# Patient Record
Sex: FEMALE | Race: WHITE
[De-identification: names, ages, dates, MRNs, and addresses within clinical notes are randomized per-mention and may not be internally consistent; named-entity substitution may affect disease eponyms.]

---

## 2017-05-03 ENCOUNTER — HOSPITAL ENCOUNTER (OUTPATIENT)
Dept: HOSPITAL 62 - WI | Age: 62
End: 2017-05-03
Attending: FAMILY MEDICINE
Payer: MEDICARE

## 2017-05-03 DIAGNOSIS — Z12.31: Primary | ICD-10-CM

## 2017-05-31 ENCOUNTER — HOSPITAL ENCOUNTER (OUTPATIENT)
Dept: HOSPITAL 62 - WI | Age: 62
End: 2017-05-31
Attending: FAMILY MEDICINE
Payer: MEDICARE

## 2017-05-31 DIAGNOSIS — Z12.31: Primary | ICD-10-CM

## 2017-05-31 PROCEDURE — 76641 ULTRASOUND BREAST COMPLETE: CPT

## 2017-05-31 NOTE — WOMENS IMAGING REPORT
EXAM DESCRIPTION:  U/S BREAST UNILATERAL, COMPL



COMPLETED DATE/TIME:  5/31/2017 9:49 am



REASON FOR STUDY:  ROUTINE SCREENING;Z12.31 Z12.31  ENCNTR SCREEN MAMMOGRAM FOR MALIGNANT NEOPLASM OF
 JEANE



COMPARISON:  Limited bilateral mammograms 11/24/2015, 5/20/2014, 2/25/2013



TECHNIQUE:  Real-time and static grayscale imaging performed of the right and left whole breast. Tamika
cted color Doppler images recorded.

The patient is in a motorized wheelchair and unable to participate with positioning for mammography t
nisha.



LIMITATIONS:  None.



FINDINGS:  Entire right breast and entire left breast for image with ultrasound.  There is dense fibr
oglandular tissue bilaterally without cysts, masses, or worrisome acoustic absorption.  No focal find
ings.



IMPRESSION:  No suspicious findings detected in the right or left breast by ultrasound.



BIRAD:  1 Negative.



RECOMMENDATION:  RECOMMENDED FOLLOW-UP: Follow-up as clinically indicated.



COMMENT:  The American College of Radiology (ACR) has developed recommendations for screening MRI of 
the breasts in certain patient populations, to be used in conjunction with mammography.  Breast MRI s
urveillance may be appropriate for women with more than 20% lifetime risk of developing breast cancer
  as determined by genetic testing, significant family history of the disease, or history of mantle r
adiation for Hodgkins Disease.  ACR Practice Guidelines 2008.



TECHNICAL DOCUMENTATION:  JOB ID:  [id number]

 2011 [employer]- All Rights Reserved

## 2017-05-31 NOTE — WOMENS IMAGING REPORT
EXAM DESCRIPTION:  U/S BREAST UNILATERAL, COMPL



COMPLETED DATE/TIME:  5/31/2017 9:49 am



REASON FOR STUDY:  ROUTINE SCREENING;Z12.31 Z12.31  ENCNTR SCREEN MAMMOGRAM FOR MALIGNANT NEOPLASM OF
 JEANE



COMPARISON:  Limited bilateral mammograms 11/24/2015, 5/20/2014, 2/25/2013



TECHNIQUE:  Real-time and static grayscale imaging performed of the right and left whole breast. Tamika
cted color Doppler images recorded.

The patient is in a motorized wheelchair and unable to participate with positioning for mammography t
nisha.



LIMITATIONS:  None.



FINDINGS:  Entire right breast and entire left breast for image with ultrasound.  There is dense fibr
oglandular tissue bilaterally without cysts, masses, or worrisome acoustic absorption.  No focal find
ings.



IMPRESSION:  No suspicious findings detected in the right or left breast by ultrasound.



BIRAD:  1 Negative.



RECOMMENDATION:  RECOMMENDED FOLLOW-UP: Follow-up as clinically indicated.



COMMENT:  The American College of Radiology (ACR) has developed recommendations for screening MRI of 
the breasts in certain patient populations, to be used in conjunction with mammography.  Breast MRI s
urveillance may be appropriate for women with more than 20% lifetime risk of developing breast cancer
  as determined by genetic testing, significant family history of the disease, or history of mantle r
adiation for Hodgkins Disease.  ACR Practice Guidelines 2008.



TECHNICAL DOCUMENTATION:  JOB ID:  [id number]

 2011 [employer]- All Rights Reserved

## 2018-09-15 ENCOUNTER — HOSPITAL ENCOUNTER (INPATIENT)
Dept: HOSPITAL 62 - ER | Age: 63
LOS: 9 days | Discharge: HOME | DRG: 178 | End: 2018-09-24
Attending: INTERNAL MEDICINE | Admitting: INTERNAL MEDICINE
Payer: MEDICARE

## 2018-09-15 DIAGNOSIS — I48.91: ICD-10-CM

## 2018-09-15 DIAGNOSIS — E34.3: ICD-10-CM

## 2018-09-15 DIAGNOSIS — E87.6: ICD-10-CM

## 2018-09-15 DIAGNOSIS — K44.9: ICD-10-CM

## 2018-09-15 DIAGNOSIS — J44.9: ICD-10-CM

## 2018-09-15 DIAGNOSIS — Z93.6: ICD-10-CM

## 2018-09-15 DIAGNOSIS — K80.20: ICD-10-CM

## 2018-09-15 DIAGNOSIS — I10: ICD-10-CM

## 2018-09-15 DIAGNOSIS — F32.9: ICD-10-CM

## 2018-09-15 DIAGNOSIS — Z88.6: ICD-10-CM

## 2018-09-15 DIAGNOSIS — J69.0: Primary | ICD-10-CM

## 2018-09-15 DIAGNOSIS — Z79.899: ICD-10-CM

## 2018-09-15 DIAGNOSIS — G80.9: ICD-10-CM

## 2018-09-15 DIAGNOSIS — K21.9: ICD-10-CM

## 2018-09-15 DIAGNOSIS — Z59.1: ICD-10-CM

## 2018-09-15 DIAGNOSIS — Z82.49: ICD-10-CM

## 2018-09-15 DIAGNOSIS — Z93.3: ICD-10-CM

## 2018-09-15 DIAGNOSIS — K56.0: ICD-10-CM

## 2018-09-15 LAB
%HYPO/RBC NFR BLD AUTO: SLIGHT %
ABSOLUTE LYMPHOCYTES# (MANUAL): 2.3 10^3/UL (ref 0.5–4.7)
ABSOLUTE MONOCYTES # (MANUAL): 0.6 10^3/UL (ref 0.1–1.4)
ABSOLUTE NEUTROPHILS# (MANUAL): 18.1 10^3/UL (ref 1.7–8.2)
ADD MANUAL DIFF: YES
ALBUMIN SERPL-MCNC: 3.7 G/DL (ref 3.5–5)
ALP SERPL-CCNC: 129 U/L (ref 38–126)
ALT SERPL-CCNC: 25 U/L (ref 9–52)
ANION GAP SERPL CALC-SCNC: 11 MMOL/L (ref 5–19)
APPEARANCE UR: CLEAR
APTT PPP: YELLOW S
AST SERPL-CCNC: 21 U/L (ref 14–36)
BASOPHILS NFR BLD MANUAL: 0 % (ref 0–2)
BILIRUB DIRECT SERPL-MCNC: 0.6 MG/DL (ref 0–0.4)
BILIRUB SERPL-MCNC: 0.8 MG/DL (ref 0.2–1.3)
BILIRUB UR QL STRIP: NEGATIVE
BUN SERPL-MCNC: 10 MG/DL (ref 7–20)
CALCIUM: 9.6 MG/DL (ref 8.4–10.2)
CHLORIDE SERPL-SCNC: 100 MMOL/L (ref 98–107)
CO2 SERPL-SCNC: 26 MMOL/L (ref 22–30)
EOSINOPHIL NFR BLD MANUAL: 1 % (ref 0–6)
ERYTHROCYTE [DISTWIDTH] IN BLOOD BY AUTOMATED COUNT: 14.7 % (ref 11.5–14)
GLUCOSE SERPL-MCNC: 116 MG/DL (ref 75–110)
GLUCOSE UR STRIP-MCNC: NEGATIVE MG/DL
HCT VFR BLD CALC: 42.6 % (ref 36–47)
HGB BLD-MCNC: 13.9 G/DL (ref 12–15.5)
KETONES UR STRIP-MCNC: 300 MG/DL
LIPASE SERPL-CCNC: 15.9 U/L (ref 23–300)
MCH RBC QN AUTO: 28 PG (ref 27–33.4)
MCHC RBC AUTO-ENTMCNC: 32.6 G/DL (ref 32–36)
MCV RBC AUTO: 86 FL (ref 80–97)
MONOCYTES % (MANUAL): 3 % (ref 3–13)
NITRITE UR QL STRIP: NEGATIVE
PH UR STRIP: 6 [PH] (ref 5–9)
PLATELET # BLD: 513 10^3/UL (ref 150–450)
PLATELET COMMENT: (no result)
POTASSIUM SERPL-SCNC: 3.8 MMOL/L (ref 3.6–5)
PROT SERPL-MCNC: 6.9 G/DL (ref 6.3–8.2)
PROT UR STRIP-MCNC: NEGATIVE MG/DL
RBC # BLD AUTO: 4.96 10^6/UL (ref 3.72–5.28)
SEGMENTED NEUTROPHILS % (MAN): 85 % (ref 42–78)
SODIUM SERPL-SCNC: 137.3 MMOL/L (ref 137–145)
SP GR UR STRIP: 1.02
TOTAL CELLS COUNTED BLD: 100
TOXIC GRANULES BLD QL SMEAR: (no result)
UROBILINOGEN UR-MCNC: NEGATIVE MG/DL (ref ?–2)
VARIANT LYMPHS NFR BLD MANUAL: 9 % (ref 13–45)
WBC # BLD AUTO: 21.3 10^3/UL (ref 4–10.5)

## 2018-09-15 PROCEDURE — 83690 ASSAY OF LIPASE: CPT

## 2018-09-15 PROCEDURE — 3E0F73Z INTRODUCTION OF ANTI-INFLAMMATORY INTO RESPIRATORY TRACT, VIA NATURAL OR ARTIFICIAL OPENING: ICD-10-PCS | Performed by: INTERNAL MEDICINE

## 2018-09-15 PROCEDURE — 85027 COMPLETE CBC AUTOMATED: CPT

## 2018-09-15 PROCEDURE — 83880 ASSAY OF NATRIURETIC PEPTIDE: CPT

## 2018-09-15 PROCEDURE — 80048 BASIC METABOLIC PNL TOTAL CA: CPT

## 2018-09-15 PROCEDURE — 81001 URINALYSIS AUTO W/SCOPE: CPT

## 2018-09-15 PROCEDURE — 96374 THER/PROPH/DIAG INJ IV PUSH: CPT

## 2018-09-15 PROCEDURE — 80053 COMPREHEN METABOLIC PANEL: CPT

## 2018-09-15 PROCEDURE — 83735 ASSAY OF MAGNESIUM: CPT

## 2018-09-15 PROCEDURE — 96361 HYDRATE IV INFUSION ADD-ON: CPT

## 2018-09-15 PROCEDURE — 94799 UNLISTED PULMONARY SVC/PX: CPT

## 2018-09-15 PROCEDURE — 71045 X-RAY EXAM CHEST 1 VIEW: CPT

## 2018-09-15 PROCEDURE — 94640 AIRWAY INHALATION TREATMENT: CPT

## 2018-09-15 PROCEDURE — 84132 ASSAY OF SERUM POTASSIUM: CPT

## 2018-09-15 PROCEDURE — 87040 BLOOD CULTURE FOR BACTERIA: CPT

## 2018-09-15 PROCEDURE — 74176 CT ABD & PELVIS W/O CONTRAST: CPT

## 2018-09-15 PROCEDURE — 36415 COLL VENOUS BLD VENIPUNCTURE: CPT

## 2018-09-15 PROCEDURE — 85025 COMPLETE CBC W/AUTO DIFF WBC: CPT

## 2018-09-15 PROCEDURE — 74018 RADEX ABDOMEN 1 VIEW: CPT

## 2018-09-15 PROCEDURE — 99291 CRITICAL CARE FIRST HOUR: CPT

## 2018-09-15 RX ADMIN — HEPARIN SODIUM SCH UNIT: 5000 INJECTION, SOLUTION INTRAVENOUS; SUBCUTANEOUS at 21:30

## 2018-09-15 RX ADMIN — IPRATROPIUM BROMIDE AND ALBUTEROL SULFATE SCH ML: 2.5; .5 SOLUTION RESPIRATORY (INHALATION) at 23:55

## 2018-09-15 SDOH — ECONOMIC STABILITY - HOUSING INSECURITY: INADEQUATE HOUSING: Z59.1

## 2018-09-15 NOTE — PDOC CONSULTATION
Consultation


Consult Date: 09/15/18


Consult reason:: Possible bowel obstruction seen on CT scan.





History of Present Illness


Admission Date/PCP: 


  





  BEAR BUI MD





History of Present Illness: 


NABILA MAGALLANES is a 63 year old female seen in consultation at the request 

of the emergency room physician.  This is a patient with cerebral palsy who 

reports coughing and vomiting.  The patient has reported malaise and fatigue 

for the last several days.  The patient has a left lower quadrant descending 

colostomy.  The patient has emptied her colostomy bag 4 times today.  There is 

currently air in the bag.  The patient reports vomiting after severe coughing 

spells.  The patient has not noticed significant fevers or chills.  The patient 

denies chest pain, abdominal pain, dizziness, blurry vision.  The patient does 

report lower extremity edema.  She denies specific food intolerances.








Past Medical History


Cardiac Medical History: Reports: Atrial Fibrillation, Hypertension


Pulmonary Medical History: Reports: Asthma, Chronic Obstructive Pulmonary 

Disease (COPD)


   Denies: Tuberculosis


Neurological Medical History: Reports: Other - Cerebral palsy


   Denies: Seizures


GI Medical History: Reports: Gastroesophageal Reflux Disease, Hiatal Hernia


Psychiatric Medical History: Reports: Depression





Past Surgical History


Past Surgical History: Reports: Orthopedic Surgery - multiple ortho surgeries 

rt cp, Other - Left lower quadrant colostomy.  Urostomy.


   Denies: Hysterectomy





Social History


Information Source: Patient


Smoking Status: Unknown if Ever Smoked


Frequency of Alcohol Use: None


Hx Recreational Drug Use: No


Hx Prescription Drug Abuse: No





Family History


Family History: Reviewed & Not Pertinent


Parental Family History Reviewed: Yes


Children Family History Reviewed: Yes


Sibling(s) Family History Reviewed.: Yes





Medication/Allergy


Home Medications: 








Celecoxib [Celebrex 100 mg Capsule] 100 mg PO BID 06/27/13 


Oxybutynin Chloride [Ditropan 5 mg Tablet] 10 mg PO Q8 PRN 06/27/13 


Cyclobenzaprine HCl [Flexeril 10 mg Tablet] 20 mg PO TID PRN 06/28/13 


Tramadol HCl [Ultram 50 mg Tablet] 50 mg PO Q6HP PRN 06/28/13 


Diltiazem HCl [Cardizem Cd 180 mg Capsule] 180 mg PO Q12H 06/30/13 


Duloxetine HCl [Cymbalta 30 mg Capsule.dr] 60 mg PO DAILY #0 capsule.dr 06/30/ 13 


Topiramate [Topamax 25 mg Tablet] 25 mg PO Q6 #0 tablet 06/30/13 


Buprenorphine [Butrans] 1 each TD Q7D 07/27/14 


Cetirizine HCl [Zyrtec 10 mg Tablet] 1 tab PO DAILY 07/27/14 


Esomeprazole Magnesium [Nexium] 40 mg PO BID 07/27/14 


Ferrous Sulfate [Iron Supplement] 325 mg PO BID 07/27/14 


Linaclotide [Linzess 145 Mcg Capsule] 145 mcg PO Q2D 07/27/14 


Lubiprostone [Amitiza 8 Mcg Capsule] 1 cap PO BID 07/27/14 


Mometasone Furoate [Nasonex] 2 spray NS DAILY 07/27/14 


Montelukast Sodium [Singulair 10 mg Tablet] 10 mg PO QHS 07/27/14 


Polyethylene Glycol 3350 [Clearlax] 119 gm PO DAILY 07/27/14 


Polyethylene Glycol 3350 [Miralax Powder 17 gm/Packet] 1 packet PO DAILY 07/27/ 14 


Albuterol Sulfate [Ventolin Hfa] 2 puff IH Q6HP PRN 07/28/14 


Butalb/Acetaminophen/Caffeine [Fioricet (-40 mg) Tablet] 1 tab PO Q6HP 

PRN 07/28/14 


Diazepam [Valium 5 mg Tablet] 10 mg PO BIDP PRN 07/28/14 


Fluticasone/Salmeterol [Advair 500-50 Diskus 28 Dose] 1 inh IH Q12H 07/28/14 


Guaifenesin [Mucinex Sr 600 mg Tablet.sa] 600 mg PO Q12 PRN 07/28/14 


Magnesium Citrate 296 ml PO DAILY PRN 07/28/14 


Oxycodone HCl 5 mg PO Q8H PRN 07/28/14 


Oxycodone HCl/Acetaminophen [Percocet 5-325 mg Tablet] 1 - 2 tab PO ASDIR PRN 07 /28/14 


Rizatriptan Benzoate [Maxalt Mlt] 10 mg PO DAILY PRN 07/28/14 


Trazodone HCl 50 mg PO QHS PRN 07/28/14 


Fluticasone/Salmeterol [Advair 250-50 Diskus 14 Dose/Diskus] 1 inh IH Q12 #0 

inhaler 07/30/14 


Moxifloxacin HCl 400 mg PO DAILY #10 tablet 07/30/14 








Allergies/Adverse Reactions: 


 





morphine [Morphine] Adverse Reaction (Verified 07/27/14 10:58)


 











Review of Systems


Constitutional: PRESENT: fatigue, weakness.  ABSENT: anorexia, fever(s), 

headache(s), night sweats


Eyes: ABSENT: visual disturbances


Ears: ABSENT: hearing changes


Nose, Mouth, and Throat: ABSENT: sore throat


Cardiovascular: ABSENT: palpitations


Respiratory: PRESENT: cough.  ABSENT: hemoptysis


Gastrointestinal: ABSENT: abdominal pain, bloating


Genitourinary: PRESENT: other - Urostomy


Musculoskeletal: ABSENT: back pain


Integumentary: ABSENT: pruritus, rash


Neurological: ABSENT: confusion, convulsions, dizziness


Psychiatric: ABSENT: anxiety, depression


Endocrine: ABSENT: cold intolerance, heat intolerance


Hematologic/Lymphatic: ABSENT: easy bleeding, easy bruising





Physical Exam


Vital Signs: 


 Intake & Output











 09/14/18 09/15/18 09/16/18





 06:59 06:59 06:59


 


Weight   45.359 kg











General appearance: ABSENT: no acute distress


Head exam: PRESENT: atraumatic


Eye exam: PRESENT: EOMI, PERRLA.  ABSENT: scleral icterus


Mouth exam: PRESENT: moist, neck supple


Neck exam: ABSENT: meningismus, tenderness, thyromegaly, tracheal deviation


Respiratory exam: PRESENT: rhonchi, unlabored.  ABSENT: chest wall tenderness


Cardiovascular exam: PRESENT: irregular rhythm


GI/Abdominal exam: PRESENT: soft, other - Left lower quadrant colostomy in 

place.  Air in the bag.  Lower midline urostomy in place..  ABSENT: distended, 

firm, guarding, tenderness


Rectal exam: PRESENT: deferred


Extremities exam: PRESENT: pedal edema, +2 edema, other - Gaiter sign.


Musculoskeletal exam: PRESENT: deformity


Neurological exam: PRESENT: alert, awake, oriented to person, oriented to place

, oriented to time, oriented to situation


Psychiatric exam: ABSENT: agitated, anxious, depressed


Skin exam: ABSENT: cyanosis, erythema





Results


Laboratory Results: 


 





 09/15/18 18:25 





 09/15/18 18:25 





 











  09/15/18 09/15/18





  18:25 18:25


 


WBC  21.3 H 


 


RBC  4.96 


 


Hgb  13.9 


 


Hct  42.6 


 


MCV  86 


 


MCH  28.0 


 


MCHC  32.6 


 


RDW  14.7 H 


 


Plt Count  513 H 


 


Seg Neutrophils %  Not Reportable 


 


Lymphocytes %  Not Reportable 


 


Monocytes %  Not Reportable 


 


Eosinophils %  Not Reportable 


 


Basophils %  Not Reportable 


 


Absolute Neutrophils  Not Reportable 


 


Absolute Lymphocytes  Not Reportable 


 


Absolute Monocytes  Not Reportable 


 


Absolute Eosinophils  Not Reportable 


 


Absolute Basophils  Not Reportable 


 


Sodium   137.3


 


Potassium   3.8


 


Chloride   100


 


Carbon Dioxide   26


 


Anion Gap   11


 


BUN   10


 


Creatinine   0.44 L


 


Est GFR ( Amer)   > 60


 


Est GFR (Non-Af Amer)   > 60


 


Glucose   116 H


 


Calcium   9.6


 


Total Bilirubin   0.8


 


AST   21


 


ALT   25


 


Alkaline Phosphatase   129 H


 


Total Protein   6.9


 


Albumin   3.7


 


Lipase   15.9 L











Impressions: 


 





Chest X-Ray  09/15/18 00:00


IMPRESSION:  Basilar atelectasis.  Large hiatal hernia.


 








Abdomen/Pelvis CT  09/15/18 16:54


IMPRESSION:  1.  Air-fluid levels within multiple dilated small bowel loops, 

raise concern for distal small bowel obstruction.  Left lower quadrant ostomy 

with a parastomal hernia containing a mildly dilated small bowel loop.


2. Distended gallbladder with cholelithiasis.


3. Trace ascites at the right upper quadrant.


4. Large hiatal hernia with an intrathoracic stomach.


5. Tree-in-bud opacities at the right middle lobe, may be secondary to acute 

infection/inflammation of the smaller airways.


 














Assessment & Plan





- Diagnosis


(1) Cerebral palsy


Qualifiers: 


   Cerebral palsy type: unspecified type   Qualified Code(s): G80.9 - Cerebral 

palsy, unspecified   


Is this a current diagnosis for this admission?: Yes   





(2) Cough


Is this a current diagnosis for this admission?: Yes   





- Plan Summary


Plan Summary: 





This is a 63-year-old female with cerebral palsy.  I was consulted to evaluate 

the possibility of a small bowel obstruction.  I see no clinical or 

radiographic evidence of small bowel obstruction or large bowel obstruction.  

The "decompressed loops of small bowel in the pelvis" are related to the patient

's urostomy and not in continuity with the GI tract.  The patient has air in 

her colostomy bag and has had multiple bowel movements today.  I believe her 

vomiting is related to her coughing spells.  Recommend medical evaluation.  No 

indication for surgical intervention at this time.

## 2018-09-15 NOTE — RADIOLOGY REPORT (SQ)
EXAM DESCRIPTION:  CHEST SINGLE VIEW



COMPLETED DATE/TIME:  9/15/2018 5:17 pm



REASON FOR STUDY:  abd bloating, vomiting



COMPARISON:  7/29/2014



EXAM PARAMETERS:  NUMBER OF VIEWS: 10 obscures the upper chest.

TECHNIQUE: Single frontal radiographic view of the chest acquired.

RADIATION DOSE: NA

LIMITATIONS: None.



FINDINGS:  LUNGS AND PLEURA: Basilar atelectasis.

MEDIASTINUM AND HILAR STRUCTURES: Large retrocardiac hiatal hernia.

HEART AND VASCULAR STRUCTURES: Heart normal in size.  Normal vasculature.

BONES: No acute findings.

HARDWARE: None in the chest.

OTHER: No other significant finding.



IMPRESSION:  Basilar atelectasis.  Large hiatal hernia.



TECHNICAL DOCUMENTATION:  JOB ID:  [id number]

 2011 [employer]- All Rights Reserved



Reading location - IP/workstation name: [email or web address]

## 2018-09-15 NOTE — RADIOLOGY REPORT (SQ)
EXAM DESCRIPTION:  CT ABD/PELVIS NO ORAL OR IV



COMPLETED DATE/TIME:  9/15/2018 5:12 pm



REASON FOR STUDY:  abd bloating, vomiting



COMPARISON:  None.



TECHNIQUE:  CT scan of the abdomen and pelvis performed without intravenous or oral contrast. Images 
reviewed with lung, soft tissue, and bone windows. Reconstructed coronal and sagittal MPR images revi
ewed. All images stored on PACS.

All CT scanners at this facility use dose modulation, iterative reconstruction, and/or weight based d
osing when appropriate to reduce radiation dose to as low as reasonably achievable (ALARA).

[facility]: Dose Right  [facility]: CareDose    [facility]: Dose Right    [facility]: Teradose 4D    [facility]: Smart MySQL



RADIATION DOSE:  CT Rad equipment meets quality standard of care and radiation dose reduction techniq
ues were employed. CTDIvol: 6.4 mGy. DLP: 272 mGy-cm.mGy.



LIMITATIONS:  None.



FINDINGS:  LOWER CHEST: There is a large hiatal hernia with an intrathoracic stomach.  Atelectatic ch
anges are seen at the bilateral lung bases.  Tree-in-bud opacities are noted at the right middle lobe
.

NON-CONTRASTED LIVER, SPLEEN, ADRENALS: Evaluation limited by lack of IV contrast. No identified sign
ificant masses.  Calcific foci at the spleen are probably granulomas

PANCREAS: No peripancreatic inflammatory changes.

GALLBLADDER: The gallbladder is distended.  Multiple small stones are seen at the gallbladder.

RIGHT KIDNEY AND URETER: Assessment for masses limited by lack of IV contrast.   No significant calci
fications.   No hydronephrosis or hydroureter.

LEFT KIDNEY AND URETER: Assessment for masses limited by lack of IV contrast.   No significant calcif
ications.   No hydronephrosis or hydroureter.

AORTA AND RETROPERITONEUM: No abdominal aortic aneurysm. No retroperitoneal hemorrhage or adenopathy.


BOWEL AND PERITONEAL CAVITY: There are multiple dilated small bowel loops with air-fluid levels.  Col
lapsed loops of small bowel are seen in the pelvis.  There is a left lower quadrant ostomy with a par
astomal hernia containing a mildly dilated small bowel loop.  Postsurgical changes are seen at bowel 
loops in the pelvis.  Trace free fluid noted at the right upper quadrant.

APPENDIX: Not visualized.

PELVIS, BLADDER, AND ABDOMINAL WALL:The urinary bladder is surgically absent.  Coarse calcifications 
noted at the pelvis.  No free fluid.  There is a right lower quadrant ostomy.

BONES: There is diffuse osteopenia.  There is thoracolumbar scoliosis.  There is fusion of the left h
ip.



IMPRESSION:  1.  Air-fluid levels within multiple dilated small bowel loops, raise concern for distal
 small bowel obstruction.  Left lower quadrant ostomy with a parastomal hernia containing a mildly di
lated small bowel loop.

2. Distended gallbladder with cholelithiasis.

3. Trace ascites at the right upper quadrant.

4. Large hiatal hernia with an intrathoracic stomach.

5. Tree-in-bud opacities at the right middle lobe, may be secondary to acute infection/inflammation o
f the smaller airways.



COMMENT:  Quality ID # [id number]: Final reports with documentation of one or more dose reduction techniques
 (e.g., Automated exposure control, adjustment of the mA and/or kV according to patient size, use of 
iterative reconstruction technique)



TECHNICAL DOCUMENTATION:  JOB ID:  [id number]

[id number]

 2011 [employer]- All Rights Reserved



Reading location - IP/workstation name: [email or web address]
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
## 2018-09-16 LAB
ADD MANUAL DIFF: NO
ANION GAP SERPL CALC-SCNC: 7 MMOL/L (ref 5–19)
BASOPHILS # BLD AUTO: 0.1 10^3/UL (ref 0–0.2)
BASOPHILS NFR BLD AUTO: 0.6 % (ref 0–2)
BUN SERPL-MCNC: 9 MG/DL (ref 7–20)
CALCIUM: 8.5 MG/DL (ref 8.4–10.2)
CHLORIDE SERPL-SCNC: 109 MMOL/L (ref 98–107)
CO2 SERPL-SCNC: 23 MMOL/L (ref 22–30)
EOSINOPHIL # BLD AUTO: 0.1 10^3/UL (ref 0–0.6)
EOSINOPHIL NFR BLD AUTO: 0.9 % (ref 0–6)
ERYTHROCYTE [DISTWIDTH] IN BLOOD BY AUTOMATED COUNT: 14.4 % (ref 11.5–14)
GLUCOSE SERPL-MCNC: 86 MG/DL (ref 75–110)
HCT VFR BLD CALC: 36.4 % (ref 36–47)
HGB BLD-MCNC: 12 G/DL (ref 12–15.5)
LYMPHOCYTES # BLD AUTO: 2.2 10^3/UL (ref 0.5–4.7)
LYMPHOCYTES NFR BLD AUTO: 15 % (ref 13–45)
MCH RBC QN AUTO: 28.3 PG (ref 27–33.4)
MCHC RBC AUTO-ENTMCNC: 33 G/DL (ref 32–36)
MCV RBC AUTO: 86 FL (ref 80–97)
MONOCYTES # BLD AUTO: 1 10^3/UL (ref 0.1–1.4)
MONOCYTES NFR BLD AUTO: 7 % (ref 3–13)
NEUTROPHILS # BLD AUTO: 11.1 10^3/UL (ref 1.7–8.2)
NEUTS SEG NFR BLD AUTO: 76.5 % (ref 42–78)
PLATELET # BLD: 378 10^3/UL (ref 150–450)
POTASSIUM SERPL-SCNC: 3.5 MMOL/L (ref 3.6–5)
RBC # BLD AUTO: 4.26 10^6/UL (ref 3.72–5.28)
SODIUM SERPL-SCNC: 138.8 MMOL/L (ref 137–145)
TOTAL CELLS COUNTED % (AUTO): 100 %
WBC # BLD AUTO: 14.6 10^3/UL (ref 4–10.5)

## 2018-09-16 RX ADMIN — Medication SCH: at 06:47

## 2018-09-16 RX ADMIN — CEFEPIME HYDROCHLORIDE SCH MLS/HR: 2 INJECTION, SOLUTION INTRAVENOUS at 09:56

## 2018-09-16 RX ADMIN — HEPARIN SODIUM SCH: 5000 INJECTION, SOLUTION INTRAVENOUS; SUBCUTANEOUS at 23:35

## 2018-09-16 RX ADMIN — HEPARIN SODIUM SCH UNIT: 5000 INJECTION, SOLUTION INTRAVENOUS; SUBCUTANEOUS at 16:35

## 2018-09-16 RX ADMIN — DEXAMETHASONE SODIUM PHOSPHATE PRN MG: 10 INJECTION INTRAMUSCULAR; INTRAVENOUS at 16:31

## 2018-09-16 RX ADMIN — HEPARIN SODIUM SCH UNIT: 5000 INJECTION, SOLUTION INTRAVENOUS; SUBCUTANEOUS at 06:49

## 2018-09-16 RX ADMIN — TRAMADOL HYDROCHLORIDE PRN MG: 50 TABLET, FILM COATED ORAL at 00:46

## 2018-09-16 RX ADMIN — DILTIAZEM HYDROCHLORIDE SCH MG: 180 CAPSULE, EXTENDED RELEASE ORAL at 09:57

## 2018-09-16 RX ADMIN — IPRATROPIUM BROMIDE AND ALBUTEROL SULFATE SCH ML: 2.5; .5 SOLUTION RESPIRATORY (INHALATION) at 16:57

## 2018-09-16 RX ADMIN — DEXAMETHASONE SODIUM PHOSPHATE PRN MG: 10 INJECTION INTRAMUSCULAR; INTRAVENOUS at 23:09

## 2018-09-16 RX ADMIN — Medication SCH: at 23:36

## 2018-09-16 RX ADMIN — SODIUM CHLORIDE PRN MLS/HR: 9 INJECTION, SOLUTION INTRAVENOUS at 01:29

## 2018-09-16 RX ADMIN — IPRATROPIUM BROMIDE AND ALBUTEROL SULFATE SCH ML: 2.5; .5 SOLUTION RESPIRATORY (INHALATION) at 08:10

## 2018-09-16 RX ADMIN — SODIUM CHLORIDE PRN MLS/HR: 9 INJECTION, SOLUTION INTRAVENOUS at 06:49

## 2018-09-16 RX ADMIN — DILTIAZEM HYDROCHLORIDE SCH: 180 CAPSULE, EXTENDED RELEASE ORAL at 10:08

## 2018-09-16 RX ADMIN — TRAZODONE HYDROCHLORIDE PRN MG: 50 TABLET ORAL at 00:46

## 2018-09-16 RX ADMIN — Medication SCH: at 00:48

## 2018-09-16 RX ADMIN — CEFEPIME HYDROCHLORIDE SCH: 2 INJECTION, SOLUTION INTRAVENOUS at 23:36

## 2018-09-16 RX ADMIN — Medication SCH ML: at 16:33

## 2018-09-16 RX ADMIN — DILTIAZEM HYDROCHLORIDE SCH: 180 CAPSULE, EXTENDED RELEASE ORAL at 01:27

## 2018-09-16 RX ADMIN — OXYCODONE HYDROCHLORIDE PRN MG: 5 TABLET ORAL at 19:11

## 2018-09-16 RX ADMIN — CEFEPIME HYDROCHLORIDE SCH MLS/HR: 2 INJECTION, SOLUTION INTRAVENOUS at 01:08
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
## 2018-09-16 NOTE — PDOC PROGRESS REPORT
Subjective


Progress Note for:: 09/16/18


Subjective:: 





64 y/o F admitted with pneumonia and cough. She denies any abdominal pain. Her 

colostomy is functioning normally. She has not had any vomiting today. No CP, 

SOB, F/C, melena, dizziness.


Reason For Visit: 


PNEUMONIA








Physical Exam


Vital Signs: 


 











Temp Pulse Resp BP Pulse Ox


 


 98.6 F   102 H  16   140/77 H  98 


 


 09/16/18 11:20  09/16/18 11:20  09/16/18 11:20  09/16/18 11:20  09/16/18 11:20








 Intake & Output











 09/15/18 09/16/18 09/17/18





 06:59 06:59 06:59


 


Intake Total  1100 1050


 


Balance  1100 1050


 


Weight  47.7 kg 











General appearance: PRESENT: no acute distress


Head exam: PRESENT: atraumatic


Eye exam: PRESENT: EOMI.  ABSENT: scleral icterus


Mouth exam: PRESENT: moist


Neck exam: ABSENT: lymphadenopathy, tenderness


Respiratory exam: PRESENT: unlabored.  ABSENT: accessory muscle use, chest wall 

tenderness, tachypnea


Cardiovascular exam: PRESENT: irregular rhythm


Pulses: PRESENT: normal radial pulses


Vascular exam: PRESENT: normal capillary refill.  ABSENT: pallor


GI/Abdominal exam: PRESENT: soft.  ABSENT: distended, guarding, tenderness


Rectal exam: PRESENT: deferred


Musculoskeletal exam: PRESENT: deformity


Neurological exam: PRESENT: alert, awake, oriented to person, oriented to place

, oriented to time, oriented to situation


Psychiatric exam: ABSENT: agitated, anxious, depressed


Skin exam: ABSENT: cyanosis, erythema, jaundice





Results


Laboratory Results: 


 





 09/16/18 04:36 





 09/16/18 04:36 





 











  09/15/18 09/16/18 09/16/18





  20:52 04:36 04:36


 


WBC   14.6 H 


 


RBC   4.26 


 


Hgb   12.0 


 


Hct   36.4 


 


MCV   86 


 


MCH   28.3 


 


MCHC   33.0 


 


RDW   14.4 H 


 


Plt Count   378 


 


Seg Neutrophils %   76.5 


 


Lymphocytes %   15.0 


 


Monocytes %   7.0 


 


Eosinophils %   0.9 


 


Basophils %   0.6 


 


Absolute Neutrophils   11.1 H 


 


Absolute Lymphocytes   2.2 


 


Absolute Monocytes   1.0 


 


Absolute Eosinophils   0.1 


 


Absolute Basophils   0.1 


 


Sodium    138.8


 


Potassium    3.5 L


 


Chloride    109 H


 


Carbon Dioxide    23


 


Anion Gap    7


 


BUN    9


 


Creatinine    0.38 L


 


Est GFR ( Amer)    > 60


 


Est GFR (Non-Af Amer)    > 60


 


Glucose    86


 


Calcium    8.5


 


Urine Color  YELLOW  


 


Urine Appearance  CLEAR  


 


Urine pH  6.0  


 


Ur Specific Gravity  1.021  


 


Urine Protein  NEGATIVE  


 


Urine Glucose (UA)  NEGATIVE  


 


Urine Ketones  300 H  


 


Urine Blood  NEGATIVE  


 


Urine Nitrite  NEGATIVE  


 


Ur Leukocyte Esterase  NEGATIVE  


 


Urine WBC (Auto)  133  


 


Urine RBC (Auto)  1  








 











  09/16/18





  04:36


 


NT-Pro-B Natriuret Pep  254











Impressions: 


 





Chest X-Ray  09/15/18 00:00


IMPRESSION:  Basilar atelectasis.  Large hiatal hernia.


 








Abdomen/Pelvis CT  09/15/18 16:54


IMPRESSION:  1.  Air-fluid levels within multiple dilated small bowel loops, 

raise concern for distal small bowel obstruction.  Left lower quadrant ostomy 

with a parastomal hernia containing a mildly dilated small bowel loop.


2. Distended gallbladder with cholelithiasis.


3. Trace ascites at the right upper quadrant.


4. Large hiatal hernia with an intrathoracic stomach.


5. Tree-in-bud opacities at the right middle lobe, may be secondary to acute 

infection/inflammation of the smaller airways.


 














Assessment & Plan





- Diagnosis


(1) Cerebral palsy


Qualifiers: 


   Cerebral palsy type: unspecified type   Qualified Code(s): G80.9 - Cerebral 

palsy, unspecified   


Is this a current diagnosis for this admission?: Yes   





(2) Cough


Is this a current diagnosis for this admission?: Yes   





- Plan Summary


Plan Summary: 





64 y/o F with concern for bowel obstruction on CT. Colostomy functioning 

normally. No nausea overnight. No abdominal tenderness. Still has cough. OK for 

diet. No surgical intervention at this time.
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
## 2018-09-17 LAB
ADD MANUAL DIFF: NO
ANION GAP SERPL CALC-SCNC: 13 MMOL/L (ref 5–19)
BASOPHILS # BLD AUTO: 0.1 10^3/UL (ref 0–0.2)
BASOPHILS NFR BLD AUTO: 0.8 % (ref 0–2)
BUN SERPL-MCNC: 6 MG/DL (ref 7–20)
CALCIUM: 9.5 MG/DL (ref 8.4–10.2)
CHLORIDE SERPL-SCNC: 108 MMOL/L (ref 98–107)
CO2 SERPL-SCNC: 20 MMOL/L (ref 22–30)
EOSINOPHIL # BLD AUTO: 0.1 10^3/UL (ref 0–0.6)
EOSINOPHIL NFR BLD AUTO: 0.4 % (ref 0–6)
ERYTHROCYTE [DISTWIDTH] IN BLOOD BY AUTOMATED COUNT: 14.3 % (ref 11.5–14)
GLUCOSE SERPL-MCNC: 110 MG/DL (ref 75–110)
HCT VFR BLD CALC: 36.4 % (ref 36–47)
HGB BLD-MCNC: 12.2 G/DL (ref 12–15.5)
LYMPHOCYTES # BLD AUTO: 1.3 10^3/UL (ref 0.5–4.7)
LYMPHOCYTES NFR BLD AUTO: 9.8 % (ref 13–45)
MCH RBC QN AUTO: 28.5 PG (ref 27–33.4)
MCHC RBC AUTO-ENTMCNC: 33.4 G/DL (ref 32–36)
MCV RBC AUTO: 85 FL (ref 80–97)
MONOCYTES # BLD AUTO: 0.8 10^3/UL (ref 0.1–1.4)
MONOCYTES NFR BLD AUTO: 5.7 % (ref 3–13)
NEUTROPHILS # BLD AUTO: 11.2 10^3/UL (ref 1.7–8.2)
NEUTS SEG NFR BLD AUTO: 83.3 % (ref 42–78)
PLATELET # BLD: 478 10^3/UL (ref 150–450)
POTASSIUM SERPL-SCNC: 3.1 MMOL/L (ref 3.6–5)
RBC # BLD AUTO: 4.28 10^6/UL (ref 3.72–5.28)
SODIUM SERPL-SCNC: 140.9 MMOL/L (ref 137–145)
TOTAL CELLS COUNTED % (AUTO): 100 %
WBC # BLD AUTO: 13.5 10^3/UL (ref 4–10.5)

## 2018-09-17 RX ADMIN — TRAZODONE HYDROCHLORIDE PRN MG: 50 TABLET ORAL at 00:52

## 2018-09-17 RX ADMIN — HEPARIN SODIUM SCH UNIT: 5000 INJECTION, SOLUTION INTRAVENOUS; SUBCUTANEOUS at 14:00

## 2018-09-17 RX ADMIN — NYSTATIN SCH UNIT: 100000 SUSPENSION ORAL at 17:23

## 2018-09-17 RX ADMIN — POTASSIUM CHLORIDE SCH MLS/HR: 29.8 INJECTION, SOLUTION INTRAVENOUS at 13:52

## 2018-09-17 RX ADMIN — Medication SCH: at 13:53

## 2018-09-17 RX ADMIN — IPRATROPIUM BROMIDE AND ALBUTEROL SULFATE SCH: 2.5; .5 SOLUTION RESPIRATORY (INHALATION) at 16:44

## 2018-09-17 RX ADMIN — CEFEPIME HYDROCHLORIDE SCH MLS/HR: 2 INJECTION, SOLUTION INTRAVENOUS at 21:51

## 2018-09-17 RX ADMIN — POTASSIUM CHLORIDE SCH MLS/HR: 29.8 INJECTION, SOLUTION INTRAVENOUS at 20:10

## 2018-09-17 RX ADMIN — DEXAMETHASONE SODIUM PHOSPHATE PRN MG: 10 INJECTION INTRAMUSCULAR; INTRAVENOUS at 18:33

## 2018-09-17 RX ADMIN — IPRATROPIUM BROMIDE AND ALBUTEROL SULFATE SCH: 2.5; .5 SOLUTION RESPIRATORY (INHALATION) at 02:03

## 2018-09-17 RX ADMIN — IPRATROPIUM BROMIDE AND ALBUTEROL SULFATE SCH ML: 2.5; .5 SOLUTION RESPIRATORY (INHALATION) at 23:53

## 2018-09-17 RX ADMIN — Medication SCH ML: at 21:51

## 2018-09-17 RX ADMIN — IPRATROPIUM BROMIDE AND ALBUTEROL SULFATE SCH: 2.5; .5 SOLUTION RESPIRATORY (INHALATION) at 08:15

## 2018-09-17 RX ADMIN — LANSOPRAZOLE SCH MG: 30 TABLET, ORALLY DISINTEGRATING, DELAYED RELEASE ORAL at 17:24

## 2018-09-17 RX ADMIN — FLUTICASONE PROPIONATE AND SALMETEROL SCH PUFF: 50; 500 POWDER RESPIRATORY (INHALATION) at 21:49

## 2018-09-17 RX ADMIN — BACLOFEN SCH: 10 TABLET ORAL at 17:16

## 2018-09-17 RX ADMIN — POTASSIUM CHLORIDE SCH MLS/HR: 29.8 INJECTION, SOLUTION INTRAVENOUS at 17:25

## 2018-09-17 RX ADMIN — HEPARIN SODIUM SCH UNIT: 5000 INJECTION, SOLUTION INTRAVENOUS; SUBCUTANEOUS at 21:50

## 2018-09-17 RX ADMIN — TOPIRAMATE SCH MG: 25 TABLET, FILM COATED ORAL at 21:56

## 2018-09-17 RX ADMIN — HEPARIN SODIUM SCH UNIT: 5000 INJECTION, SOLUTION INTRAVENOUS; SUBCUTANEOUS at 05:52

## 2018-09-17 RX ADMIN — TOBRAMYCIN AND DEXAMETHASONE SCH GM: 3; 1 OINTMENT OPHTHALMIC at 22:02

## 2018-09-17 RX ADMIN — DEXAMETHASONE SODIUM PHOSPHATE PRN MG: 10 INJECTION INTRAMUSCULAR; INTRAVENOUS at 08:15

## 2018-09-17 RX ADMIN — POTASSIUM CHLORIDE SCH MLS/HR: 29.8 INJECTION, SOLUTION INTRAVENOUS at 12:09

## 2018-09-17 RX ADMIN — Medication SCH: at 10:27

## 2018-09-17 RX ADMIN — TRAZODONE HYDROCHLORIDE PRN MG: 50 TABLET ORAL at 21:56

## 2018-09-17 RX ADMIN — OXYCODONE HYDROCHLORIDE PRN MG: 5 TABLET ORAL at 05:53

## 2018-09-17 RX ADMIN — CEFEPIME HYDROCHLORIDE SCH MLS/HR: 2 INJECTION, SOLUTION INTRAVENOUS at 10:31

## 2018-09-17 NOTE — PDOC PROGRESS REPORT
Subjective


Progress Note for:: 09/17/18


Subjective:: 





nausea and vomiting with mild RUQ pains


Reason For Visit: 


PNEUMONIA








Physical Exam


Vital Signs: 


 











Temp Pulse Resp BP Pulse Ox


 


 98.7 F   120 H  20   147/86 H  97 


 


 09/17/18 11:53  09/17/18 14:00  09/17/18 11:53  09/17/18 11:53  09/17/18 11:53








 Intake & Output











 09/16/18 09/17/18 09/18/18





 06:59 06:59 06:59


 


Intake Total 1100 1938 150


 


Output Total  1650 


 


Balance 1100 288 150


 


Weight 47.7 kg 48.6 kg 











Exam: 





abd is soft with mild RUQ tenderness.





Results


Laboratory Results: 


 





 09/17/18 04:39 





 09/17/18 04:39 





 











  09/17/18 09/17/18 09/17/18





  04:39 04:39 04:39


 


WBC  13.5 H  


 


RBC  4.28  


 


Hgb  12.2  


 


Hct  36.4  


 


MCV  85  


 


MCH  28.5  


 


MCHC  33.4  


 


RDW  14.3 H  


 


Plt Count  478 H  


 


Seg Neutrophils %  83.3 H  


 


Lymphocytes %  9.8 L  


 


Monocytes %  5.7  


 


Eosinophils %  0.4  


 


Basophils %  0.8  


 


Absolute Neutrophils  11.2 H  


 


Absolute Lymphocytes  1.3  


 


Absolute Monocytes  0.8  


 


Absolute Eosinophils  0.1  


 


Absolute Basophils  0.1  


 


Sodium   140.9 


 


Potassium   3.1 L 


 


Chloride   108 H 


 


Carbon Dioxide   20 L 


 


Anion Gap   13 


 


BUN   6 L 


 


Creatinine   0.39 L 


 


Est GFR ( Amer)   > 60 


 


Est GFR (Non-Af Amer)   > 60 


 


Glucose   110 


 


Calcium   9.5 


 


Magnesium    2.0








 











  09/16/18





  04:36


 


NT-Pro-B Natriuret Pep  254











Impressions: 


 





Chest X-Ray  09/15/18 00:00


IMPRESSION:  Basilar atelectasis.  Large hiatal hernia.


 








Abdomen/Pelvis CT  09/15/18 16:54


IMPRESSION:  1.  Air-fluid levels within multiple dilated small bowel loops, 

raise concern for distal small bowel obstruction.  Left lower quadrant ostomy 

with a parastomal hernia containing a mildly dilated small bowel loop.


2. Distended gallbladder with cholelithiasis.


3. Trace ascites at the right upper quadrant.


4. Large hiatal hernia with an intrathoracic stomach.


5. Tree-in-bud opacities at the right middle lobe, may be secondary to acute 

infection/inflammation of the smaller airways.


 








KUB X-Ray  09/17/18 00:00


IMPRESSION:  Slight improvement in the small bowel obstruction.


 














Assessment & Plan





- Diagnosis


(1) Cholelithiasis


Is this a current diagnosis for this admission?: Yes   





- Time


Time Spent with patient: 15-24 minutes





- Inpatient Certification


Medical Necessity: Significant Comorbidiites Make Outpatient Treatment Too Risky

, Need For IV Fluids, Risk of Complication if Not Cared For in Hospital





- Plan Summary


Plan Summary: 





Need to R/O acute cholecystitis with HIDA scan. Hopefully can be done tomorrow 

unless patient improves clinically.


KUB shows improving SBO pattern. Not a good surgical candidate with her 

contracture due to cerebral palsy with Urostomy and colostomy.


Continue hydration

## 2018-09-17 NOTE — RADIOLOGY REPORT (SQ)
EXAM DESCRIPTION:  KUB/ABDOMEN (SINGLE VIEW)



COMPLETED DATE/TIME:  9/17/2018 10:05 am



REASON FOR STUDY:  SBO



COMPARISON:  CT scan and CT  9/15/2018



NUMBER OF VIEWS:  One view.



TECHNIQUE:   Supine radiographic image of the abdomen acquired.



LIMITATIONS:  None.



FINDINGS:  BOWEL GAS PATTERN: Slight improvement in the appearance of the dilated small bowel loops.

CALCIFICATIONS: No suspicious calcifications.

SOFT TISSUES: No gross mass or suggestion of organomegaly.

HARDWARE: Left lower quadrant ostomy.  Multiple surgical clips.

BONES: Fused left hip.

OTHER: No other significant finding.



IMPRESSION:  Slight improvement in the small bowel obstruction.



TECHNICAL DOCUMENTATION:  JOB ID:  [id number]

 2011 [email or web address]- All Rights Reserved



Reading location - IP/workstation name: [email or web address]
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
## 2018-09-18 LAB
ABSOLUTE LYMPHOCYTES# (MANUAL): 2.8 10^3/UL (ref 0.5–4.7)
ABSOLUTE MONOCYTES # (MANUAL): 1.1 10^3/UL (ref 0.1–1.4)
ABSOLUTE NEUTROPHILS# (MANUAL): 9.6 10^3/UL (ref 1.7–8.2)
ADD MANUAL DIFF: YES
ANION GAP SERPL CALC-SCNC: 7 MMOL/L (ref 5–19)
ANISOCYTOSIS BLD QL SMEAR: SLIGHT
BASOPHILS NFR BLD MANUAL: 0 % (ref 0–2)
BUN SERPL-MCNC: 7 MG/DL (ref 7–20)
CALCIUM: 8.7 MG/DL (ref 8.4–10.2)
CHLORIDE SERPL-SCNC: 114 MMOL/L (ref 98–107)
CO2 SERPL-SCNC: 22 MMOL/L (ref 22–30)
EOSINOPHIL NFR BLD MANUAL: 0 % (ref 0–6)
ERYTHROCYTE [DISTWIDTH] IN BLOOD BY AUTOMATED COUNT: 14.1 % (ref 11.5–14)
GLUCOSE SERPL-MCNC: 87 MG/DL (ref 75–110)
HCT VFR BLD CALC: 33.2 % (ref 36–47)
HGB BLD-MCNC: 11 G/DL (ref 12–15.5)
MCH RBC QN AUTO: 28.2 PG (ref 27–33.4)
MCHC RBC AUTO-ENTMCNC: 33.1 G/DL (ref 32–36)
MCV RBC AUTO: 85 FL (ref 80–97)
MONOCYTES % (MANUAL): 8 % (ref 3–13)
PLATELET # BLD: 384 10^3/UL (ref 150–450)
PLATELET COMMENT: ADEQUATE
POLYCHROMASIA BLD QL SMEAR: (no result)
POTASSIUM SERPL-SCNC: 4.3 MMOL/L (ref 3.6–5)
RBC # BLD AUTO: 3.9 10^6/UL (ref 3.72–5.28)
SEGMENTED NEUTROPHILS % (MAN): 71 % (ref 42–78)
SODIUM SERPL-SCNC: 142.9 MMOL/L (ref 137–145)
TOTAL CELLS COUNTED BLD: 100
TOXIC GRANULES BLD QL SMEAR: (no result)
VARIANT LYMPHS NFR BLD MANUAL: 21 % (ref 13–45)
WBC # BLD AUTO: 13.5 10^3/UL (ref 4–10.5)

## 2018-09-18 RX ADMIN — TRAMADOL HYDROCHLORIDE PRN MG: 50 TABLET, FILM COATED ORAL at 10:41

## 2018-09-18 RX ADMIN — NYSTATIN SCH UNIT: 100000 SUSPENSION ORAL at 22:42

## 2018-09-18 RX ADMIN — TRAMADOL HYDROCHLORIDE PRN MG: 50 TABLET, FILM COATED ORAL at 17:58

## 2018-09-18 RX ADMIN — BACLOFEN SCH MG: 10 TABLET ORAL at 17:58

## 2018-09-18 RX ADMIN — Medication SCH: at 17:54

## 2018-09-18 RX ADMIN — CEFEPIME HYDROCHLORIDE SCH MLS/HR: 2 INJECTION, SOLUTION INTRAVENOUS at 22:43

## 2018-09-18 RX ADMIN — CEFEPIME HYDROCHLORIDE SCH MLS/HR: 2 INJECTION, SOLUTION INTRAVENOUS at 10:34

## 2018-09-18 RX ADMIN — HEPARIN SODIUM SCH UNIT: 5000 INJECTION, SOLUTION INTRAVENOUS; SUBCUTANEOUS at 22:43

## 2018-09-18 RX ADMIN — LANSOPRAZOLE SCH MG: 30 TABLET, ORALLY DISINTEGRATING, DELAYED RELEASE ORAL at 10:41

## 2018-09-18 RX ADMIN — IPRATROPIUM BROMIDE AND ALBUTEROL SULFATE SCH ML: 2.5; .5 SOLUTION RESPIRATORY (INHALATION) at 08:53

## 2018-09-18 RX ADMIN — HEPARIN SODIUM SCH UNIT: 5000 INJECTION, SOLUTION INTRAVENOUS; SUBCUTANEOUS at 17:58

## 2018-09-18 RX ADMIN — FLUTICASONE PROPIONATE AND SALMETEROL SCH PUFF: 50; 500 POWDER RESPIRATORY (INHALATION) at 22:43

## 2018-09-18 RX ADMIN — DULOXETINE SCH MG: 30 CAPSULE, DELAYED RELEASE ORAL at 10:41

## 2018-09-18 RX ADMIN — IPRATROPIUM BROMIDE AND ALBUTEROL SULFATE SCH ML: 2.5; .5 SOLUTION RESPIRATORY (INHALATION) at 23:40

## 2018-09-18 RX ADMIN — IPRATROPIUM BROMIDE AND ALBUTEROL SULFATE SCH ML: 2.5; .5 SOLUTION RESPIRATORY (INHALATION) at 16:56

## 2018-09-18 RX ADMIN — TOBRAMYCIN AND DEXAMETHASONE SCH GM: 3; 1 OINTMENT OPHTHALMIC at 22:44

## 2018-09-18 RX ADMIN — LANSOPRAZOLE SCH MG: 30 TABLET, ORALLY DISINTEGRATING, DELAYED RELEASE ORAL at 17:58

## 2018-09-18 RX ADMIN — TRAMADOL HYDROCHLORIDE PRN MG: 50 TABLET, FILM COATED ORAL at 00:30

## 2018-09-18 RX ADMIN — TRAZODONE HYDROCHLORIDE PRN MG: 50 TABLET ORAL at 22:44

## 2018-09-18 RX ADMIN — NYSTATIN SCH UNIT: 100000 SUSPENSION ORAL at 05:28

## 2018-09-18 RX ADMIN — NYSTATIN SCH UNIT: 100000 SUSPENSION ORAL at 00:27

## 2018-09-18 RX ADMIN — TOPIRAMATE SCH MG: 25 TABLET, FILM COATED ORAL at 22:44

## 2018-09-18 RX ADMIN — FLUTICASONE PROPIONATE AND SALMETEROL SCH PUFF: 50; 500 POWDER RESPIRATORY (INHALATION) at 10:35

## 2018-09-18 RX ADMIN — Medication SCH ML: at 05:29

## 2018-09-18 RX ADMIN — Medication SCH ML: at 22:43

## 2018-09-18 RX ADMIN — NYSTATIN SCH: 100000 SUSPENSION ORAL at 17:54

## 2018-09-18 RX ADMIN — TOPIRAMATE SCH MG: 25 TABLET, FILM COATED ORAL at 10:36

## 2018-09-18 RX ADMIN — HEPARIN SODIUM SCH UNIT: 5000 INJECTION, SOLUTION INTRAVENOUS; SUBCUTANEOUS at 05:28

## 2018-09-18 RX ADMIN — BACLOFEN SCH MG: 10 TABLET ORAL at 10:36
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
## 2018-09-19 RX ADMIN — TOBRAMYCIN AND DEXAMETHASONE SCH GM: 3; 1 OINTMENT OPHTHALMIC at 21:11

## 2018-09-19 RX ADMIN — TOPIRAMATE SCH MG: 25 TABLET, FILM COATED ORAL at 21:12

## 2018-09-19 RX ADMIN — Medication SCH: at 05:52

## 2018-09-19 RX ADMIN — NYSTATIN SCH UNIT: 100000 SUSPENSION ORAL at 06:57

## 2018-09-19 RX ADMIN — Medication SCH ML: at 21:11

## 2018-09-19 RX ADMIN — CEFEPIME HYDROCHLORIDE SCH MLS/HR: 2 INJECTION, SOLUTION INTRAVENOUS at 09:33

## 2018-09-19 RX ADMIN — LANSOPRAZOLE SCH MG: 30 TABLET, ORALLY DISINTEGRATING, DELAYED RELEASE ORAL at 16:12

## 2018-09-19 RX ADMIN — DULOXETINE SCH MG: 30 CAPSULE, DELAYED RELEASE ORAL at 09:35

## 2018-09-19 RX ADMIN — TRAMADOL HYDROCHLORIDE PRN MG: 50 TABLET, FILM COATED ORAL at 16:12

## 2018-09-19 RX ADMIN — TOPIRAMATE SCH MG: 25 TABLET, FILM COATED ORAL at 09:35

## 2018-09-19 RX ADMIN — FLUTICASONE PROPIONATE AND SALMETEROL SCH PUFF: 50; 500 POWDER RESPIRATORY (INHALATION) at 21:10

## 2018-09-19 RX ADMIN — NYSTATIN SCH: 100000 SUSPENSION ORAL at 05:51

## 2018-09-19 RX ADMIN — HEPARIN SODIUM SCH UNIT: 5000 INJECTION, SOLUTION INTRAVENOUS; SUBCUTANEOUS at 13:28

## 2018-09-19 RX ADMIN — HEPARIN SODIUM SCH UNIT: 5000 INJECTION, SOLUTION INTRAVENOUS; SUBCUTANEOUS at 21:16

## 2018-09-19 RX ADMIN — NYSTATIN SCH UNIT: 100000 SUSPENSION ORAL at 16:59

## 2018-09-19 RX ADMIN — LANSOPRAZOLE SCH MG: 30 TABLET, ORALLY DISINTEGRATING, DELAYED RELEASE ORAL at 09:36

## 2018-09-19 RX ADMIN — ACETAMINOPHEN PRN MG: 325 TABLET ORAL at 21:13

## 2018-09-19 RX ADMIN — Medication SCH ML: at 13:27

## 2018-09-19 RX ADMIN — BACLOFEN SCH MG: 10 TABLET ORAL at 09:36

## 2018-09-19 RX ADMIN — BACLOFEN SCH MG: 10 TABLET ORAL at 17:00

## 2018-09-19 RX ADMIN — NYSTATIN SCH UNIT: 100000 SUSPENSION ORAL at 13:27

## 2018-09-19 RX ADMIN — IPRATROPIUM BROMIDE AND ALBUTEROL SULFATE SCH ML: 2.5; .5 SOLUTION RESPIRATORY (INHALATION) at 07:56

## 2018-09-19 RX ADMIN — TRAZODONE HYDROCHLORIDE PRN MG: 50 TABLET ORAL at 21:13

## 2018-09-19 RX ADMIN — CEFEPIME HYDROCHLORIDE SCH MLS/HR: 2 INJECTION, SOLUTION INTRAVENOUS at 21:11

## 2018-09-19 RX ADMIN — IPRATROPIUM BROMIDE AND ALBUTEROL SULFATE SCH ML: 2.5; .5 SOLUTION RESPIRATORY (INHALATION) at 15:46

## 2018-09-19 RX ADMIN — NYSTATIN SCH UNIT: 100000 SUSPENSION ORAL at 23:57

## 2018-09-19 RX ADMIN — OXYCODONE HYDROCHLORIDE PRN MG: 5 TABLET ORAL at 21:12

## 2018-09-19 RX ADMIN — FLUTICASONE PROPIONATE AND SALMETEROL SCH PUFF: 50; 500 POWDER RESPIRATORY (INHALATION) at 09:36

## 2018-09-19 RX ADMIN — HEPARIN SODIUM SCH: 5000 INJECTION, SOLUTION INTRAVENOUS; SUBCUTANEOUS at 05:51

## 2018-09-19 RX ADMIN — IPRATROPIUM BROMIDE AND ALBUTEROL SULFATE SCH ML: 2.5; .5 SOLUTION RESPIRATORY (INHALATION) at 23:35

## 2018-09-20 LAB
ABSOLUTE LYMPHOCYTES# (MANUAL): 2.7 10^3/UL (ref 0.5–4.7)
ABSOLUTE MONOCYTES # (MANUAL): 0.4 10^3/UL (ref 0.1–1.4)
ABSOLUTE NEUTROPHILS# (MANUAL): 8.7 10^3/UL (ref 1.7–8.2)
ADD MANUAL DIFF: YES
ANION GAP SERPL CALC-SCNC: 10 MMOL/L (ref 5–19)
BASOPHILS NFR BLD MANUAL: 1 % (ref 0–2)
BUN SERPL-MCNC: 4 MG/DL (ref 7–20)
CALCIUM: 9.5 MG/DL (ref 8.4–10.2)
CHLORIDE SERPL-SCNC: 106 MMOL/L (ref 98–107)
CO2 SERPL-SCNC: 24 MMOL/L (ref 22–30)
EOSINOPHIL NFR BLD MANUAL: 3 % (ref 0–6)
ERYTHROCYTE [DISTWIDTH] IN BLOOD BY AUTOMATED COUNT: 14.7 % (ref 11.5–14)
GLUCOSE SERPL-MCNC: 116 MG/DL (ref 75–110)
HCT VFR BLD CALC: 42.1 % (ref 36–47)
HGB BLD-MCNC: 13.6 G/DL (ref 12–15.5)
MCH RBC QN AUTO: 27.8 PG (ref 27–33.4)
MCHC RBC AUTO-ENTMCNC: 32.3 G/DL (ref 32–36)
MCV RBC AUTO: 86 FL (ref 80–97)
METAMYELOCYTES % (MANUAL): 2 %
MONOCYTES % (MANUAL): 3 % (ref 3–13)
NEUTS BAND NFR BLD MANUAL: 2 % (ref 3–5)
PLATELET # BLD: 477 10^3/UL (ref 150–450)
PLATELET COMMENT: ADEQUATE
POLYCHROMASIA BLD QL SMEAR: SLIGHT
POTASSIUM SERPL-SCNC: 3 MMOL/L (ref 3.6–5)
RBC # BLD AUTO: 4.9 10^6/UL (ref 3.72–5.28)
SEGMENTED NEUTROPHILS % (MAN): 67 % (ref 42–78)
SODIUM SERPL-SCNC: 139.7 MMOL/L (ref 137–145)
TOTAL CELLS COUNTED BLD: 100
TOXIC GRANULES BLD QL SMEAR: SLIGHT
VARIANT LYMPHS NFR BLD MANUAL: 20 % (ref 13–45)
WBC # BLD AUTO: 12.2 10^3/UL (ref 4–10.5)
WBC TOXIC VACUOLES BLD QL SMEAR: PRESENT

## 2018-09-20 RX ADMIN — TRAMADOL HYDROCHLORIDE PRN MG: 50 TABLET, FILM COATED ORAL at 07:58

## 2018-09-20 RX ADMIN — POTASSIUM CHLORIDE SCH MLS/HR: 29.8 INJECTION, SOLUTION INTRAVENOUS at 15:23

## 2018-09-20 RX ADMIN — NYSTATIN SCH UNIT: 100000 SUSPENSION ORAL at 05:54

## 2018-09-20 RX ADMIN — CEFEPIME HYDROCHLORIDE SCH MLS/HR: 2 INJECTION, SOLUTION INTRAVENOUS at 09:30

## 2018-09-20 RX ADMIN — FLUTICASONE PROPIONATE AND SALMETEROL SCH PUFF: 50; 500 POWDER RESPIRATORY (INHALATION) at 21:41

## 2018-09-20 RX ADMIN — CEFEPIME HYDROCHLORIDE SCH MLS/HR: 2 INJECTION, SOLUTION INTRAVENOUS at 21:42

## 2018-09-20 RX ADMIN — Medication SCH ML: at 13:33

## 2018-09-20 RX ADMIN — OXYCODONE HYDROCHLORIDE PRN MG: 5 TABLET ORAL at 21:44

## 2018-09-20 RX ADMIN — Medication SCH ML: at 05:54

## 2018-09-20 RX ADMIN — NYSTATIN SCH UNIT: 100000 SUSPENSION ORAL at 11:27

## 2018-09-20 RX ADMIN — IPRATROPIUM BROMIDE AND ALBUTEROL SULFATE SCH ML: 2.5; .5 SOLUTION RESPIRATORY (INHALATION) at 08:19

## 2018-09-20 RX ADMIN — TOPIRAMATE SCH MG: 25 TABLET, FILM COATED ORAL at 09:28

## 2018-09-20 RX ADMIN — BACLOFEN SCH MG: 10 TABLET ORAL at 09:29

## 2018-09-20 RX ADMIN — LANSOPRAZOLE SCH MG: 30 TABLET, ORALLY DISINTEGRATING, DELAYED RELEASE ORAL at 17:12

## 2018-09-20 RX ADMIN — POTASSIUM CHLORIDE SCH MLS/HR: 29.8 INJECTION, SOLUTION INTRAVENOUS at 12:58

## 2018-09-20 RX ADMIN — POTASSIUM CHLORIDE SCH MLS/HR: 29.8 INJECTION, SOLUTION INTRAVENOUS at 11:27

## 2018-09-20 RX ADMIN — TOPIRAMATE SCH MG: 25 TABLET, FILM COATED ORAL at 21:42

## 2018-09-20 RX ADMIN — FLUTICASONE PROPIONATE AND SALMETEROL SCH PUFF: 50; 500 POWDER RESPIRATORY (INHALATION) at 09:29

## 2018-09-20 RX ADMIN — NYSTATIN SCH UNIT: 100000 SUSPENSION ORAL at 17:13

## 2018-09-20 RX ADMIN — DULOXETINE SCH MG: 30 CAPSULE, DELAYED RELEASE ORAL at 09:29

## 2018-09-20 RX ADMIN — Medication SCH ML: at 21:42

## 2018-09-20 RX ADMIN — IPRATROPIUM BROMIDE AND ALBUTEROL SULFATE SCH ML: 2.5; .5 SOLUTION RESPIRATORY (INHALATION) at 15:44

## 2018-09-20 RX ADMIN — HEPARIN SODIUM SCH UNIT: 5000 INJECTION, SOLUTION INTRAVENOUS; SUBCUTANEOUS at 13:33

## 2018-09-20 RX ADMIN — TOBRAMYCIN AND DEXAMETHASONE SCH GM: 3; 1 OINTMENT OPHTHALMIC at 21:42

## 2018-09-20 RX ADMIN — TRAZODONE HYDROCHLORIDE PRN MG: 50 TABLET ORAL at 21:42

## 2018-09-20 RX ADMIN — HEPARIN SODIUM SCH UNIT: 5000 INJECTION, SOLUTION INTRAVENOUS; SUBCUTANEOUS at 21:41

## 2018-09-20 RX ADMIN — LANSOPRAZOLE SCH MG: 30 TABLET, ORALLY DISINTEGRATING, DELAYED RELEASE ORAL at 09:30

## 2018-09-20 RX ADMIN — HEPARIN SODIUM SCH UNIT: 5000 INJECTION, SOLUTION INTRAVENOUS; SUBCUTANEOUS at 05:53

## 2018-09-20 RX ADMIN — BACLOFEN SCH MG: 10 TABLET ORAL at 17:12
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
## 2018-09-21 RX ADMIN — Medication SCH: at 05:57

## 2018-09-21 RX ADMIN — BACLOFEN SCH MG: 10 TABLET ORAL at 16:59

## 2018-09-21 RX ADMIN — OXYCODONE HYDROCHLORIDE PRN MG: 5 TABLET ORAL at 06:07

## 2018-09-21 RX ADMIN — HEPARIN SODIUM SCH: 5000 INJECTION, SOLUTION INTRAVENOUS; SUBCUTANEOUS at 13:06

## 2018-09-21 RX ADMIN — Medication SCH: at 13:06

## 2018-09-21 RX ADMIN — IPRATROPIUM BROMIDE AND ALBUTEROL SULFATE SCH ML: 2.5; .5 SOLUTION RESPIRATORY (INHALATION) at 16:05

## 2018-09-21 RX ADMIN — NYSTATIN SCH UNIT: 100000 SUSPENSION ORAL at 16:59

## 2018-09-21 RX ADMIN — TOBRAMYCIN AND DEXAMETHASONE SCH GM: 3; 1 OINTMENT OPHTHALMIC at 22:02

## 2018-09-21 RX ADMIN — ACETAMINOPHEN PRN MG: 325 TABLET ORAL at 03:28

## 2018-09-21 RX ADMIN — IPRATROPIUM BROMIDE AND ALBUTEROL SULFATE SCH ML: 2.5; .5 SOLUTION RESPIRATORY (INHALATION) at 09:32

## 2018-09-21 RX ADMIN — DIAZEPAM PRN MG: 5 TABLET ORAL at 03:29

## 2018-09-21 RX ADMIN — FLUTICASONE PROPIONATE AND SALMETEROL SCH PUFF: 50; 500 POWDER RESPIRATORY (INHALATION) at 22:02

## 2018-09-21 RX ADMIN — HEPARIN SODIUM SCH: 5000 INJECTION, SOLUTION INTRAVENOUS; SUBCUTANEOUS at 05:57

## 2018-09-21 RX ADMIN — TOPIRAMATE SCH MG: 25 TABLET, FILM COATED ORAL at 09:07

## 2018-09-21 RX ADMIN — LANSOPRAZOLE SCH MG: 30 TABLET, ORALLY DISINTEGRATING, DELAYED RELEASE ORAL at 16:59

## 2018-09-21 RX ADMIN — BACLOFEN SCH MG: 10 TABLET ORAL at 09:07

## 2018-09-21 RX ADMIN — TRAMADOL HYDROCHLORIDE PRN MG: 50 TABLET, FILM COATED ORAL at 03:28

## 2018-09-21 RX ADMIN — Medication SCH ML: at 22:03

## 2018-09-21 RX ADMIN — NYSTATIN SCH UNIT: 100000 SUSPENSION ORAL at 00:00

## 2018-09-21 RX ADMIN — TRAMADOL HYDROCHLORIDE PRN MG: 50 TABLET, FILM COATED ORAL at 16:59

## 2018-09-21 RX ADMIN — OXYCODONE HYDROCHLORIDE PRN MG: 5 TABLET ORAL at 20:07

## 2018-09-21 RX ADMIN — CEFEPIME HYDROCHLORIDE SCH MLS/HR: 2 INJECTION, SOLUTION INTRAVENOUS at 09:08

## 2018-09-21 RX ADMIN — HEPARIN SODIUM SCH UNIT: 5000 INJECTION, SOLUTION INTRAVENOUS; SUBCUTANEOUS at 22:04

## 2018-09-21 RX ADMIN — IPRATROPIUM BROMIDE AND ALBUTEROL SULFATE SCH ML: 2.5; .5 SOLUTION RESPIRATORY (INHALATION) at 00:41

## 2018-09-21 RX ADMIN — TOPIRAMATE SCH MG: 25 TABLET, FILM COATED ORAL at 22:02

## 2018-09-21 RX ADMIN — FLUTICASONE PROPIONATE AND SALMETEROL SCH PUFF: 50; 500 POWDER RESPIRATORY (INHALATION) at 09:08

## 2018-09-21 RX ADMIN — NYSTATIN SCH UNIT: 100000 SUSPENSION ORAL at 11:35

## 2018-09-21 RX ADMIN — CEFEPIME HYDROCHLORIDE SCH MLS/HR: 2 INJECTION, SOLUTION INTRAVENOUS at 22:04

## 2018-09-21 RX ADMIN — DULOXETINE SCH MG: 30 CAPSULE, DELAYED RELEASE ORAL at 09:07

## 2018-09-21 RX ADMIN — NYSTATIN SCH UNIT: 100000 SUSPENSION ORAL at 23:53

## 2018-09-21 RX ADMIN — LANSOPRAZOLE SCH MG: 30 TABLET, ORALLY DISINTEGRATING, DELAYED RELEASE ORAL at 08:27

## 2018-09-21 RX ADMIN — NYSTATIN SCH UNIT: 100000 SUSPENSION ORAL at 06:07

## 2018-09-21 NOTE — PROGRESS NOTE
Provider Note


Provider Note: 





Patient has an ostomy and colostomy bag on exam no chest pain, no cough, and no shortness of breath.
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
## 2018-09-22 RX ADMIN — DIAZEPAM PRN MG: 5 TABLET ORAL at 10:26

## 2018-09-22 RX ADMIN — BACLOFEN SCH MG: 10 TABLET ORAL at 10:23

## 2018-09-22 RX ADMIN — IPRATROPIUM BROMIDE AND ALBUTEROL SULFATE SCH ML: 2.5; .5 SOLUTION RESPIRATORY (INHALATION) at 07:44

## 2018-09-22 RX ADMIN — FLUTICASONE PROPIONATE AND SALMETEROL SCH PUFF: 50; 500 POWDER RESPIRATORY (INHALATION) at 21:45

## 2018-09-22 RX ADMIN — IPRATROPIUM BROMIDE AND ALBUTEROL SULFATE SCH ML: 2.5; .5 SOLUTION RESPIRATORY (INHALATION) at 23:24

## 2018-09-22 RX ADMIN — CEFEPIME HYDROCHLORIDE SCH MLS/HR: 2 INJECTION, SOLUTION INTRAVENOUS at 10:23

## 2018-09-22 RX ADMIN — Medication SCH ML: at 21:46

## 2018-09-22 RX ADMIN — MONTELUKAST SODIUM SCH MG: 10 TABLET, FILM COATED ORAL at 21:46

## 2018-09-22 RX ADMIN — TOBRAMYCIN AND DEXAMETHASONE SCH GM: 3; 1 OINTMENT OPHTHALMIC at 21:46

## 2018-09-22 RX ADMIN — BACLOFEN SCH MG: 10 TABLET ORAL at 17:12

## 2018-09-22 RX ADMIN — Medication SCH ML: at 05:07

## 2018-09-22 RX ADMIN — HEPARIN SODIUM SCH UNIT: 5000 INJECTION, SOLUTION INTRAVENOUS; SUBCUTANEOUS at 05:05

## 2018-09-22 RX ADMIN — NYSTATIN SCH UNIT: 100000 SUSPENSION ORAL at 12:32

## 2018-09-22 RX ADMIN — TOPIRAMATE SCH MG: 25 TABLET, FILM COATED ORAL at 21:45

## 2018-09-22 RX ADMIN — NYSTATIN SCH UNIT: 100000 SUSPENSION ORAL at 23:04

## 2018-09-22 RX ADMIN — IPRATROPIUM BROMIDE AND ALBUTEROL SULFATE SCH ML: 2.5; .5 SOLUTION RESPIRATORY (INHALATION) at 00:40

## 2018-09-22 RX ADMIN — OXYCODONE HYDROCHLORIDE PRN MG: 5 TABLET ORAL at 07:30

## 2018-09-22 RX ADMIN — TOPIRAMATE SCH MG: 25 TABLET, FILM COATED ORAL at 10:24

## 2018-09-22 RX ADMIN — IPRATROPIUM BROMIDE AND ALBUTEROL SULFATE SCH ML: 2.5; .5 SOLUTION RESPIRATORY (INHALATION) at 16:07

## 2018-09-22 RX ADMIN — DULOXETINE SCH MG: 30 CAPSULE, DELAYED RELEASE ORAL at 10:23

## 2018-09-22 RX ADMIN — LANSOPRAZOLE SCH MG: 30 TABLET, ORALLY DISINTEGRATING, DELAYED RELEASE ORAL at 17:12

## 2018-09-22 RX ADMIN — Medication SCH: at 13:50

## 2018-09-22 RX ADMIN — HEPARIN SODIUM SCH UNIT: 5000 INJECTION, SOLUTION INTRAVENOUS; SUBCUTANEOUS at 21:46

## 2018-09-22 RX ADMIN — LANSOPRAZOLE SCH MG: 30 TABLET, ORALLY DISINTEGRATING, DELAYED RELEASE ORAL at 07:25

## 2018-09-22 RX ADMIN — FLUTICASONE PROPIONATE AND SALMETEROL SCH PUFF: 50; 500 POWDER RESPIRATORY (INHALATION) at 10:23

## 2018-09-22 RX ADMIN — CETIRIZINE HYDROCHLORIDE SCH MG: 10 TABLET, FILM COATED ORAL at 21:45

## 2018-09-22 RX ADMIN — NYSTATIN SCH UNIT: 100000 SUSPENSION ORAL at 05:05

## 2018-09-22 RX ADMIN — HEPARIN SODIUM SCH: 5000 INJECTION, SOLUTION INTRAVENOUS; SUBCUTANEOUS at 13:47

## 2018-09-22 RX ADMIN — NYSTATIN SCH UNIT: 100000 SUSPENSION ORAL at 17:12
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
## 2018-09-23 LAB
ADD MANUAL DIFF: NO
ANION GAP SERPL CALC-SCNC: 7 MMOL/L (ref 5–19)
BASOPHILS # BLD AUTO: 0 10^3/UL (ref 0–0.2)
BASOPHILS NFR BLD AUTO: 0.2 % (ref 0–2)
BUN SERPL-MCNC: 7 MG/DL (ref 7–20)
CALCIUM: 9.2 MG/DL (ref 8.4–10.2)
CHLORIDE SERPL-SCNC: 109 MMOL/L (ref 98–107)
CO2 SERPL-SCNC: 25 MMOL/L (ref 22–30)
EOSINOPHIL # BLD AUTO: 0.4 10^3/UL (ref 0–0.6)
EOSINOPHIL NFR BLD AUTO: 3.3 % (ref 0–6)
ERYTHROCYTE [DISTWIDTH] IN BLOOD BY AUTOMATED COUNT: 14.9 % (ref 11.5–14)
GLUCOSE SERPL-MCNC: 86 MG/DL (ref 75–110)
HCT VFR BLD CALC: 37.4 % (ref 36–47)
HGB BLD-MCNC: 12.2 G/DL (ref 12–15.5)
LYMPHOCYTES # BLD AUTO: 2.9 10^3/UL (ref 0.5–4.7)
LYMPHOCYTES NFR BLD AUTO: 25.3 % (ref 13–45)
MCH RBC QN AUTO: 28 PG (ref 27–33.4)
MCHC RBC AUTO-ENTMCNC: 32.6 G/DL (ref 32–36)
MCV RBC AUTO: 86 FL (ref 80–97)
MONOCYTES # BLD AUTO: 1 10^3/UL (ref 0.1–1.4)
MONOCYTES NFR BLD AUTO: 8.9 % (ref 3–13)
NEUTROPHILS # BLD AUTO: 7.2 10^3/UL (ref 1.7–8.2)
NEUTS SEG NFR BLD AUTO: 62.3 % (ref 42–78)
PLATELET # BLD: 405 10^3/UL (ref 150–450)
POTASSIUM SERPL-SCNC: 3.3 MMOL/L (ref 3.6–5)
RBC # BLD AUTO: 4.36 10^6/UL (ref 3.72–5.28)
SODIUM SERPL-SCNC: 140.8 MMOL/L (ref 137–145)
TOTAL CELLS COUNTED % (AUTO): 100 %
WBC # BLD AUTO: 11.6 10^3/UL (ref 4–10.5)

## 2018-09-23 RX ADMIN — NYSTATIN SCH UNIT: 100000 SUSPENSION ORAL at 05:30

## 2018-09-23 RX ADMIN — OXYCODONE HYDROCHLORIDE PRN MG: 5 TABLET ORAL at 15:26

## 2018-09-23 RX ADMIN — HEPARIN SODIUM SCH UNIT: 5000 INJECTION, SOLUTION INTRAVENOUS; SUBCUTANEOUS at 05:30

## 2018-09-23 RX ADMIN — Medication SCH ML: at 21:24

## 2018-09-23 RX ADMIN — OXYCODONE HYDROCHLORIDE PRN MG: 5 TABLET ORAL at 05:46

## 2018-09-23 RX ADMIN — FLUTICASONE PROPIONATE AND SALMETEROL SCH PUFF: 50; 500 POWDER RESPIRATORY (INHALATION) at 21:24

## 2018-09-23 RX ADMIN — MONTELUKAST SODIUM SCH MG: 10 TABLET, FILM COATED ORAL at 21:24

## 2018-09-23 RX ADMIN — Medication SCH ML: at 05:30

## 2018-09-23 RX ADMIN — NYSTATIN SCH: 100000 SUSPENSION ORAL at 11:48

## 2018-09-23 RX ADMIN — IPRATROPIUM BROMIDE AND ALBUTEROL SULFATE SCH ML: 2.5; .5 SOLUTION RESPIRATORY (INHALATION) at 08:46

## 2018-09-23 RX ADMIN — TOPIRAMATE SCH MG: 25 TABLET, FILM COATED ORAL at 21:25

## 2018-09-23 RX ADMIN — CETIRIZINE HYDROCHLORIDE SCH MG: 10 TABLET, FILM COATED ORAL at 21:25

## 2018-09-23 RX ADMIN — HEPARIN SODIUM SCH: 5000 INJECTION, SOLUTION INTRAVENOUS; SUBCUTANEOUS at 14:08

## 2018-09-23 RX ADMIN — POTASSIUM CHLORIDE SCH MEQ: 1.5 SOLUTION ORAL at 10:09

## 2018-09-23 RX ADMIN — NYSTATIN SCH UNIT: 100000 SUSPENSION ORAL at 17:34

## 2018-09-23 RX ADMIN — DULOXETINE SCH MG: 30 CAPSULE, DELAYED RELEASE ORAL at 10:02

## 2018-09-23 RX ADMIN — FLUTICASONE PROPIONATE AND SALMETEROL SCH PUFF: 50; 500 POWDER RESPIRATORY (INHALATION) at 10:02

## 2018-09-23 RX ADMIN — BACLOFEN SCH MG: 10 TABLET ORAL at 17:34

## 2018-09-23 RX ADMIN — TRAZODONE HYDROCHLORIDE PRN MG: 50 TABLET ORAL at 21:25

## 2018-09-23 RX ADMIN — POTASSIUM CHLORIDE SCH MEQ: 1.5 SOLUTION ORAL at 17:34

## 2018-09-23 RX ADMIN — TOBRAMYCIN AND DEXAMETHASONE SCH GM: 3; 1 OINTMENT OPHTHALMIC at 21:26

## 2018-09-23 RX ADMIN — IPRATROPIUM BROMIDE AND ALBUTEROL SULFATE SCH ML: 2.5; .5 SOLUTION RESPIRATORY (INHALATION) at 15:59

## 2018-09-23 RX ADMIN — TOPIRAMATE SCH MG: 25 TABLET, FILM COATED ORAL at 10:02

## 2018-09-23 RX ADMIN — Medication SCH: at 14:08

## 2018-09-23 RX ADMIN — NYSTATIN SCH UNIT: 100000 SUSPENSION ORAL at 23:35

## 2018-09-23 RX ADMIN — LANSOPRAZOLE SCH MG: 30 TABLET, ORALLY DISINTEGRATING, DELAYED RELEASE ORAL at 10:01

## 2018-09-23 RX ADMIN — LANSOPRAZOLE SCH MG: 30 TABLET, ORALLY DISINTEGRATING, DELAYED RELEASE ORAL at 17:34

## 2018-09-23 RX ADMIN — HEPARIN SODIUM SCH UNIT: 5000 INJECTION, SOLUTION INTRAVENOUS; SUBCUTANEOUS at 21:24

## 2018-09-23 RX ADMIN — BACLOFEN SCH MG: 10 TABLET ORAL at 10:02
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
## 2018-09-24 VITALS — SYSTOLIC BLOOD PRESSURE: 126 MMHG | DIASTOLIC BLOOD PRESSURE: 83 MMHG

## 2018-09-24 LAB
ERYTHROCYTE [DISTWIDTH] IN BLOOD BY AUTOMATED COUNT: 15.4 % (ref 11.5–14)
HCT VFR BLD CALC: 39.7 % (ref 36–47)
HGB BLD-MCNC: 12.7 G/DL (ref 12–15.5)
MCH RBC QN AUTO: 28 PG (ref 27–33.4)
MCHC RBC AUTO-ENTMCNC: 31.9 G/DL (ref 32–36)
MCV RBC AUTO: 88 FL (ref 80–97)
PLATELET # BLD: 478 10^3/UL (ref 150–450)
RBC # BLD AUTO: 4.53 10^6/UL (ref 3.72–5.28)
WBC # BLD AUTO: 11.3 10^3/UL (ref 4–10.5)

## 2018-09-24 RX ADMIN — FLUTICASONE PROPIONATE AND SALMETEROL SCH PUFF: 50; 500 POWDER RESPIRATORY (INHALATION) at 10:04

## 2018-09-24 RX ADMIN — DULOXETINE SCH MG: 30 CAPSULE, DELAYED RELEASE ORAL at 10:04

## 2018-09-24 RX ADMIN — OXYCODONE HYDROCHLORIDE PRN MG: 5 TABLET ORAL at 05:59

## 2018-09-24 RX ADMIN — NYSTATIN SCH UNIT: 100000 SUSPENSION ORAL at 05:58

## 2018-09-24 RX ADMIN — HEPARIN SODIUM SCH UNIT: 5000 INJECTION, SOLUTION INTRAVENOUS; SUBCUTANEOUS at 05:59

## 2018-09-24 RX ADMIN — IPRATROPIUM BROMIDE AND ALBUTEROL SULFATE SCH ML: 2.5; .5 SOLUTION RESPIRATORY (INHALATION) at 08:38

## 2018-09-24 RX ADMIN — TOPIRAMATE SCH MG: 25 TABLET, FILM COATED ORAL at 10:04

## 2018-09-24 RX ADMIN — LANSOPRAZOLE SCH MG: 30 TABLET, ORALLY DISINTEGRATING, DELAYED RELEASE ORAL at 10:03

## 2018-09-24 RX ADMIN — IPRATROPIUM BROMIDE AND ALBUTEROL SULFATE SCH ML: 2.5; .5 SOLUTION RESPIRATORY (INHALATION) at 00:09

## 2018-09-24 RX ADMIN — BACLOFEN SCH MG: 10 TABLET ORAL at 10:04

## 2018-09-24 RX ADMIN — Medication SCH ML: at 05:59
